# Patient Record
Sex: FEMALE | Race: WHITE
[De-identification: names, ages, dates, MRNs, and addresses within clinical notes are randomized per-mention and may not be internally consistent; named-entity substitution may affect disease eponyms.]

---

## 2017-09-14 ENCOUNTER — HOSPITAL ENCOUNTER (EMERGENCY)
Dept: HOSPITAL 4 - SED | Age: 42
Discharge: HOME | End: 2017-09-14
Payer: MEDICAID

## 2017-09-14 VITALS — WEIGHT: 180 LBS | HEIGHT: 63 IN | BODY MASS INDEX: 31.89 KG/M2

## 2017-09-14 VITALS — SYSTOLIC BLOOD PRESSURE: 118 MMHG
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
## 2017-09-14 DIAGNOSIS — S63.502A: Primary | ICD-10-CM
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
## 2017-09-14 LAB
APPEARANCE UR: CLEAR
BACTERIA URNS QL MICRO: (no result) /HPF
BILIRUB UR QL STRIP: NEGATIVE
COLOR UR: YELLOW
GLUCOSE UR STRIP-MCNC: NEGATIVE MG/DL
HGB UR QL STRIP: (no result)
KETONES UR STRIP-MCNC: NEGATIVE MG/DL
LEUKOCYTE ESTERASE UR QL STRIP: NEGATIVE
MUCOUS THREADS URNS QL MICRO: (no result) /LPF
NITRITE UR QL STRIP: NEGATIVE
PH UR STRIP: 5.5 [PH] (ref 5–8)
PROT UR QL STRIP: NEGATIVE
RBC #/AREA URNS HPF: (no result) /HPF (ref 0–3)
SP GR UR STRIP: 1.02 (ref 1–1.03)
UROBILINOGEN UR STRIP-MCNC: 0.2 MG/DL (ref 0.2–1)
WBC #/AREA URNS HPF: (no result) /HPF (ref 0–3)

## 2017-09-14 NOTE — NUR
Patient given written and verbal discharge instructions and verbalizes 
understanding.  ER MD discussed with patient the results and treatment 
provided. Patient in stable condition. ID arm band removed. 

Rx of Motrin given. Patient educated on pain management and to follow up with 
PMD in 2-3 days. Pain Scale 0/10

Opportunity for questions provided and answered.

## 2017-09-14 NOTE — NUR
Patient ambulates to bedside. Patient complains of left wrist pain radiating 
towards elbow for 3 days with intermittent swelling.  Denies any trauma.  Pain 
6/10 shooting.    No other complaints/injuries per patient or as noted.  Will 
continue to monitor.

## 2017-09-14 NOTE — NUR
-------------------------------------------------------------------------------

           *** Note undone in ED - 09/14/17 at 2239 by [email or web address] ***            

-------------------------------------------------------------------------------

Patient left arm placed in velcro arm brace.  Patient tolerated well.

## 2019-09-24 ENCOUNTER — HOSPITAL ENCOUNTER (EMERGENCY)
Dept: HOSPITAL 4 - SED | Age: 44
Discharge: HOME | End: 2019-09-24
Payer: MEDICAID

## 2019-09-24 VITALS — HEIGHT: 62 IN | WEIGHT: 193 LBS | BODY MASS INDEX: 35.51 KG/M2

## 2019-09-24 VITALS — SYSTOLIC BLOOD PRESSURE: 133 MMHG

## 2019-09-24 VITALS — SYSTOLIC BLOOD PRESSURE: 139 MMHG

## 2019-09-24 DIAGNOSIS — G43.909: ICD-10-CM

## 2019-09-24 DIAGNOSIS — R07.89: Primary | ICD-10-CM

## 2019-09-24 PROCEDURE — 81025 URINE PREGNANCY TEST: CPT

## 2019-09-24 PROCEDURE — 99283 EMERGENCY DEPT VISIT LOW MDM: CPT

## 2019-09-24 PROCEDURE — 71045 X-RAY EXAM CHEST 1 VIEW: CPT

## 2019-09-24 PROCEDURE — 96372 THER/PROPH/DIAG INJ SC/IM: CPT

## 2019-09-24 PROCEDURE — 93005 ELECTROCARDIOGRAM TRACING: CPT

## 2020-02-02 ENCOUNTER — HOSPITAL ENCOUNTER (EMERGENCY)
Dept: HOSPITAL 4 - SED | Age: 45
Discharge: HOME | End: 2020-02-02
Payer: MEDICAID

## 2020-02-02 VITALS — SYSTOLIC BLOOD PRESSURE: 115 MMHG

## 2020-02-02 VITALS — WEIGHT: 186 LBS | HEIGHT: 63 IN | BODY MASS INDEX: 32.96 KG/M2

## 2020-02-02 VITALS — SYSTOLIC BLOOD PRESSURE: 123 MMHG

## 2020-02-02 DIAGNOSIS — N39.0: Primary | ICD-10-CM

## 2020-02-02 LAB
APPEARANCE UR: (no result)
BACTERIA URNS QL MICRO: (no result) /HPF
BILIRUB UR QL STRIP: NEGATIVE
COLOR UR: YELLOW
GLUCOSE UR STRIP-MCNC: NEGATIVE MG/DL
HGB UR QL STRIP: (no result)
KETONES UR STRIP-MCNC: NEGATIVE MG/DL
LEUKOCYTE ESTERASE UR QL STRIP: (no result)
NITRITE UR QL STRIP: POSITIVE
PH UR STRIP: 6.5 [PH] (ref 5–8)
PROT UR QL STRIP: (no result)
RBC #/AREA URNS HPF: (no result) /HPF (ref 0–3)
SP GR UR STRIP: 1.02 (ref 1–1.03)
UROBILINOGEN UR STRIP-MCNC: 0.2 MG/DL (ref 0.2–1)
WBC #/AREA URNS HPF: >100 /HPF (ref 0–3)

## 2020-02-02 PROCEDURE — 36415 COLL VENOUS BLD VENIPUNCTURE: CPT

## 2020-02-02 PROCEDURE — 87040 BLOOD CULTURE FOR BACTERIA: CPT

## 2020-02-02 PROCEDURE — 81025 URINE PREGNANCY TEST: CPT

## 2020-02-02 PROCEDURE — 81000 URINALYSIS NONAUTO W/SCOPE: CPT

## 2020-02-02 PROCEDURE — 87086 URINE CULTURE/COLONY COUNT: CPT

## 2020-02-02 PROCEDURE — 99283 EMERGENCY DEPT VISIT LOW MDM: CPT

## 2020-02-02 PROCEDURE — 96375 TX/PRO/DX INJ NEW DRUG ADDON: CPT

## 2020-02-02 PROCEDURE — 96365 THER/PROPH/DIAG IV INF INIT: CPT

## 2020-02-02 NOTE — NUR
Pt presents to ER with c/o left flank pain and urinary problems. Pt A&Ox4. Pt 
states she was here 3 weeks ago and was diagnosed with Kidney Infection and 
prescribed Cipro. Pt states she finished all antibiotics and still has the same 
symptoms. Pt states she has pain in left lower abdomen that radiates to left 
flank. Pt states she has urinary frequency and burning with urination. Pt 
states history of kidney stones. Will continue to monitor.

## 2020-02-02 NOTE — NUR
Patient given written and verbal discharge instructions and verbalizes 
understanding.  ER MD Mi discussed with patient the results and treatment 
provided. Patient in stable condition. ID arm band removed. IV catheter removed 
intact and dressing applied, no active bleeding.

Rx of macrobid given. Patient educated on pain management and to follow up with 
PMD. Pain Scale 1/10.

Opportunity for questions provided and answered. Medication side effect fact 
sheet provided.

## 2020-02-02 NOTE — NUR
# 20 gauge angiocath placed to L AC.  Use of asceptic technique.  Opsite placed 
over site.  Blood return noted. Flushed with 10 cc of normal saline.  No 
evidence of infiltration noted.  Patient tolerated well.

## 2021-09-19 ENCOUNTER — HOSPITAL ENCOUNTER (EMERGENCY)
Dept: HOSPITAL 4 - SED | Age: 46
Discharge: HOME | End: 2021-09-19
Payer: MEDICAID

## 2021-09-19 VITALS — HEIGHT: 64 IN | WEIGHT: 190 LBS | BODY MASS INDEX: 32.44 KG/M2

## 2021-09-19 VITALS — SYSTOLIC BLOOD PRESSURE: 132 MMHG

## 2021-09-19 VITALS — SYSTOLIC BLOOD PRESSURE: 137 MMHG
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
## 2021-09-19 DIAGNOSIS — S00.86XA: Primary | ICD-10-CM

## 2021-09-19 DIAGNOSIS — Z79.899: ICD-10-CM
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
## 2021-09-19 PROCEDURE — 96372 THER/PROPH/DIAG INJ SC/IM: CPT

## 2021-09-19 PROCEDURE — 99283 EMERGENCY DEPT VISIT LOW MDM: CPT

## 2021-09-19 NOTE — NUR
Patient given written and verbal discharge instructions and verbalizes 
understanding.  ER MD discussed with patient the results and treatment 
provided. Patient in stable condition. ID arm band removed. 

Rx of EPI PEN given. Patient educated on pain management and to follow up with 
PMD. Pain Scale 0/10.

Opportunity for questions provided and answered. Medication side effect fact 
sheet provided.